# Patient Record
Sex: MALE | Race: ASIAN | Employment: OTHER | URBAN - METROPOLITAN AREA
[De-identification: names, ages, dates, MRNs, and addresses within clinical notes are randomized per-mention and may not be internally consistent; named-entity substitution may affect disease eponyms.]

---

## 2017-01-04 ENCOUNTER — HOSPITAL ENCOUNTER (OUTPATIENT)
Dept: INFUSION THERAPY | Age: 82
Discharge: HOME | End: 2017-01-04
Payer: COMMERCIAL

## 2017-01-04 VITALS
RESPIRATION RATE: 18 BRPM | TEMPERATURE: 97 F | HEART RATE: 54 BPM | DIASTOLIC BLOOD PRESSURE: 78 MMHG | SYSTOLIC BLOOD PRESSURE: 141 MMHG

## 2017-01-04 PROCEDURE — 74011250636 HC RX REV CODE- 250/636: Performed by: INTERNAL MEDICINE

## 2017-01-04 PROCEDURE — 96402 CHEMO HORMON ANTINEOPL SQ/IM: CPT

## 2017-01-04 RX ADMIN — DEGARELIX 80 MG: KIT at 10:33

## 2017-02-01 ENCOUNTER — HOSPITAL ENCOUNTER (OUTPATIENT)
Dept: LAB | Age: 82
Discharge: HOME | End: 2017-02-01
Payer: COMMERCIAL

## 2017-02-01 ENCOUNTER — OFFICE VISIT (OUTPATIENT)
Dept: ONCOLOGY | Age: 82
End: 2017-02-01

## 2017-02-01 ENCOUNTER — HOSPITAL ENCOUNTER (OUTPATIENT)
Dept: INFUSION THERAPY | Age: 82
Discharge: HOME | End: 2017-02-01
Payer: COMMERCIAL

## 2017-02-01 ENCOUNTER — HOSPITAL ENCOUNTER (OUTPATIENT)
Dept: ONCOLOGY | Age: 82
Discharge: HOME | End: 2017-02-01

## 2017-02-01 VITALS
OXYGEN SATURATION: 96 % | HEART RATE: 56 BPM | TEMPERATURE: 97.3 F | SYSTOLIC BLOOD PRESSURE: 146 MMHG | DIASTOLIC BLOOD PRESSURE: 79 MMHG | RESPIRATION RATE: 20 BRPM

## 2017-02-01 VITALS — DIASTOLIC BLOOD PRESSURE: 57 MMHG | SYSTOLIC BLOOD PRESSURE: 152 MMHG | TEMPERATURE: 98.3 F | HEART RATE: 40 BPM

## 2017-02-01 DIAGNOSIS — N18.9 ANEMIA IN CHRONIC KIDNEY DISEASE (CKD): ICD-10-CM

## 2017-02-01 DIAGNOSIS — C61 PROSTATE CANCER (HCC): Primary | ICD-10-CM

## 2017-02-01 DIAGNOSIS — D63.1 ANEMIA IN CHRONIC KIDNEY DISEASE (CKD): ICD-10-CM

## 2017-02-01 DIAGNOSIS — C61 PROSTATE CANCER (HCC): ICD-10-CM

## 2017-02-01 LAB
ALBUMIN SERPL BCP-MCNC: 3.7 G/DL (ref 3.4–5)
ALBUMIN/GLOB SERPL: 1.1 {RATIO} (ref 0.8–1.7)
ALP SERPL-CCNC: 76 U/L (ref 45–117)
ALT SERPL-CCNC: 22 U/L (ref 16–61)
ANION GAP BLD CALC-SCNC: 4 MMOL/L (ref 3–18)
AST SERPL W P-5'-P-CCNC: 16 U/L (ref 15–37)
BASO+EOS+MONOS # BLD AUTO: 1.1 K/UL (ref 0–2.3)
BASO+EOS+MONOS # BLD AUTO: 16 % (ref 0.1–17)
BILIRUB SERPL-MCNC: 0.7 MG/DL (ref 0.2–1)
BUN SERPL-MCNC: 21 MG/DL (ref 7–18)
BUN/CREAT SERPL: 16 (ref 12–20)
CALCIUM SERPL-MCNC: 8.8 MG/DL (ref 8.5–10.1)
CHLORIDE SERPL-SCNC: 105 MMOL/L (ref 100–108)
CO2 SERPL-SCNC: 31 MMOL/L (ref 21–32)
CREAT SERPL-MCNC: 1.32 MG/DL (ref 0.6–1.3)
DIFFERENTIAL METHOD BLD: ABNORMAL
ERYTHROCYTE [DISTWIDTH] IN BLOOD BY AUTOMATED COUNT: 15.5 % (ref 11.5–14.5)
FERRITIN SERPL-MCNC: 267 NG/ML (ref 8–388)
GLOBULIN SER CALC-MCNC: 3.5 G/DL (ref 2–4)
GLUCOSE SERPL-MCNC: 159 MG/DL (ref 74–99)
HCT VFR BLD AUTO: 34.8 % (ref 36–48)
HGB BLD-MCNC: 11.6 G/DL (ref 12–16)
IRON SATN MFR SERPL: 28 %
IRON SERPL-MCNC: 92 UG/DL (ref 50–175)
LYMPHOCYTES # BLD AUTO: 29 % (ref 14–44)
LYMPHOCYTES # BLD: 2 K/UL (ref 1.1–5.9)
MCH RBC QN AUTO: 24.2 PG (ref 25–35)
MCHC RBC AUTO-ENTMCNC: 33.3 G/DL (ref 31–37)
MCV RBC AUTO: 72.7 FL (ref 78–102)
NEUTS SEG # BLD: 3.9 K/UL (ref 1.8–9.5)
NEUTS SEG NFR BLD AUTO: 55 % (ref 40–70)
PLATELET # BLD AUTO: 259 K/UL (ref 140–440)
POTASSIUM SERPL-SCNC: 4.4 MMOL/L (ref 3.5–5.5)
PROT SERPL-MCNC: 7.2 G/DL (ref 6.4–8.2)
PSA SERPL-MCNC: 22.2 NG/ML (ref 0–4)
RBC # BLD AUTO: 4.79 M/UL (ref 4.1–5.1)
SODIUM SERPL-SCNC: 140 MMOL/L (ref 136–145)
TIBC SERPL-MCNC: 326 UG/DL (ref 250–450)
WBC # BLD AUTO: 7 K/UL (ref 4.5–13)

## 2017-02-01 PROCEDURE — 83540 ASSAY OF IRON: CPT | Performed by: NURSE PRACTITIONER

## 2017-02-01 PROCEDURE — 84153 ASSAY OF PSA TOTAL: CPT | Performed by: NURSE PRACTITIONER

## 2017-02-01 PROCEDURE — 80053 COMPREHEN METABOLIC PANEL: CPT | Performed by: NURSE PRACTITIONER

## 2017-02-01 PROCEDURE — 74011250636 HC RX REV CODE- 250/636: Performed by: INTERNAL MEDICINE

## 2017-02-01 PROCEDURE — 82728 ASSAY OF FERRITIN: CPT | Performed by: NURSE PRACTITIONER

## 2017-02-01 PROCEDURE — 96402 CHEMO HORMON ANTINEOPL SQ/IM: CPT

## 2017-02-01 RX ADMIN — DEGARELIX 80 MG: KIT at 14:06

## 2017-02-01 NOTE — PROGRESS NOTES
[facility] Progress Note    Date: 2017    Name: Marycarmen Keita              MRN: [id number]              : 1920    Chemotherapy: Marissa Morgan 80mg    Mr. Brayden Lay was assessed and education was provided. Mr. Kathrin Underwood vitals were reviewed and patient was observed for 5 minutes prior to treatment. Visit Vitals    /79 (BP 1 Location: Right arm, BP Patient Position: At rest)    Pulse (!) 56    Temp 97.3 °F (36.3 °C)    Resp 20    SpO2 96%       Firmagon 80mg injection was administered SC  to L  lower quadrants of abdomen. Mr. Brayden Lay tolerated injection, no s/s of adverse reaction noted, pt has no complaints at this time. Patient armband removed and shredded. Mr. Brayden Lay was discharged from [place] [address] in stable condition at 1410. He is to return on 3/1/2017 at 1300 for his next Marissa Morgan appointment.      Homer Cunha RN  2017  4:23 PM

## 2017-02-01 NOTE — MR AVS SNAPSHOT
Visit Information Date & Time Provider Department Dept. Phone Encounter #  
 2/1/2017  1:45 PM Petty Tavera MD [facility] Office [phone] [id number] Follow-up Instructions Return in about 2 months (around 4/1/2017). Your Appointments 2/1/2017  1:45 PM  
Office Visit with Petty Tavera MD  
[facility] Office ([address]) Appt Note: 2 MO RET [address] [address]  
[address]  
  
   
 [address] [facility] [address] [address]  
  
    
 4/4/2017  1:30 PM  
Office Visit with Tirso Crook NP  
[facility] Office ([address]) Appt Note: 2 month check [address] [address]  
[address]  
  
   
 [address] [facility] [address] [address] Upcoming Health Maintenance Date Due DTaP/Tdap/Td series (1 - Tdap) 11/28/1941 ZOSTER VACCINE AGE 60> 11/28/1980 GLAUCOMA SCREENING Q2Y 11/28/1985 Pneumococcal 65+ High/Highest Risk (1 of 2 - PCV13) 11/28/1985 MEDICARE YEARLY EXAM 11/28/1985 Allergies as of 2/1/2017  Review Complete On: 1/4/2017 By: Elsie Chacko RN Severity Noted Reaction Type Reactions Contrast Agent [Iodine]  10/19/2016   Intolerance Unknown (comments) Lisinopril  10/19/2016   Intolerance Other (comments) Not allergic, current medication Poultry  10/19/2016   Intolerance Other (comments) Current Immunizations  Reviewed on 11/9/2016 Name Date Influenza Vaccine (Quad) PF 10/17/2016 10:23 AM  
  
 Not reviewed this visit You Were Diagnosed With   
  
 Codes Comments Prostate cancer [facility])    -  Primary ICD-10-CM: P67 ICD-9-CM: 055 Anemia in chronic kidney disease (CKD)     ICD-10-CM: N18.9, D63.1 ICD-9-CM: 285.21, 585.9 Vitals BP Pulse Temp Smoking Status 152/57 (!) 40 98.3 °F (36.8 °C) Never Smoker Preferred Pharmacy Pharmacy Name Phone [facility], [address] [phone] Your Updated Medication List  
  
   
This list is accurate as of: 2/1/17  1:36 PM.  Always use your most recent med list.  
  
  
  
  
 aspirin delayed-release 81 mg tablet Take  by mouth daily. cholecalciferol 1,000 unit Cap Commonly known as:  VITAMIN D3 Take  by mouth daily. lisinopril 5 mg tablet Commonly known as:  Arnold Files Take 1 Tab by mouth daily. meloxicam 7.5 mg tablet Commonly known as:  MOBIC Take  by mouth daily. simethicone 80 mg chewable tablet Commonly known as:  Iker Nim Take 80 mg by mouth every six (6) hours as needed for Flatulence. simvastatin 40 mg tablet Commonly known as:  ZOCOR Take 20 mg by mouth nightly. We Performed the Following COMPLETE CBC & AUTO DIFF WBC [99354 CPT(R)] Follow-up Instructions Return in about 2 months (around 4/1/2017). To-Do List   
 02/01/2017 Lab:  CBC WITH 3 PART DIFF   
  
 02/01/2017  2:00 PM  
  Appointment with Alfa Hough 2 at [address] [place] [address] [phone]) Introducing [facility] SERVICES! [employer] introduces [employer] patient portal. Now you can access parts of your medical record, email your doctor's office, and request medication refills online. 1. In your internet browser, go to [email or web address]! 2. Click on the First Time User? Click Here link in the Sign In box. You will see the New Member Sign Up page. 3. Enter your [employer] Access Code exactly as it appears below. You will not need to use this code after youve completed the sign-up process. If you do not sign up before the expiration date, you must request a new code. · [employer] Access Code: [email or web address] Expires: 4/25/2017  1:59 PM 
 
4.  Enter the last four digits of your Social Security Number (xxxx) and Date of Birth (mm/dd/yyyy) as indicated and click Submit. You will be taken to the next sign-up page. 5. Create a Findline ID. This will be your Findline login ID and cannot be changed, so think of one that is secure and easy to remember. 6. Create a Findline password. You can change your password at any time. 7. Enter your Password Reset Question and Answer. This can be used at a later time if you forget your password. 8. Enter your e-mail address. You will receive e-mail notification when new information is available in [address] 9. Click Sign Up. You can now view and download portions of your medical record. 10. Click the Download Summary menu link to download a portable copy of your medical information. If you have questions, please visit the Frequently Asked Questions section of the Findline website. Remember, Findline is NOT to be used for urgent needs. For medical emergencies, dial 911. Now available from your iPhone and Android! Please provide this summary of care documentation to your next provider. Your primary care clinician is listed as Carmella Smith. If you have any questions after today's visit, please call [phone].

## 2017-02-01 NOTE — PROGRESS NOTES
Hematology/Oncology  Progress Note    Name: Kingsley Monaco  Date: 2017  : 1920    Camila Vu MD     Mr. Adriana Sánchez is a 80 y.o.  male who was seen for management of his metastatic prostate cancer and anemia due to chronic kidney disease. Current therapy: Firmagon 240 mg loading dose followed by 80 mg monthly and Procrit whenever the hemoglobin is below 10 g/dL and hematocrit is below 30%. Subjective:     Mr. Adriana Sánchez is a 80-year-old man with metastatic prostate cancer. He continues to receive  Firmagon  Q 4 weeks. He is  Tolerating his treatment well. He is here today for a follow-up visit reporting that he is doing reasonably well. He states he had a cold and cough but now feels better. His energy level continues to  improve. He is scheduled to receive his Valarie Love today in the [place]. He has no new complaints of pain or discomfort. Past medical history, family history, and social history: these were reviewed and remains unchanged. Past Medical History   Diagnosis Date    Dyslipidemia     Hyperlipidemia     Hypertension     Prostate cancer ([facility] [address]     Vitamin D deficiency      No past surgical history on file. Social History     Social History    Marital status: UNKNOWN     Spouse name: N/A    Number of children: N/A    Years of education: N/A     Occupational History    Not on file. Social History Main Topics    Smoking status: Never Smoker    Smokeless tobacco: Not on file    Alcohol use No    Drug use: No    Sexual activity: Not on file     Other Topics Concern    Not on file     Social History Narrative     Family History   Problem Relation Age of Onset   Tricia Calderon Arthritis-osteo Mother     Hypertension Mother     Arthritis-osteo Father     Hypertension Father      Current Outpatient Prescriptions   Medication Sig Dispense Refill    aspirin delayed-release 81 mg tablet Take  by mouth daily.  lisinopril (PRINIVIL, ZESTRIL) 5 mg tablet Take 1 Tab by mouth daily.  30 Tab 0  simvastatin (ZOCOR) 40 mg tablet Take 20 mg by mouth nightly.  simethicone (MYLICON) 80 mg chewable tablet Take 80 mg by mouth every six (6) hours as needed for Flatulence.  meloxicam (MOBIC) 7.5 mg tablet Take  by mouth daily.  cholecalciferol (VITAMIN D3) 1,000 unit cap Take  by mouth daily. Facility-Administered Medications Ordered in Other Visits   Medication Dose Route Frequency Provider Last Rate Last Dose    degarelix (FIRMAGON) injection 80 mg  80 mg SubCUTAneous ONCE Earnstine Bence, MD           Review of Systems  Constitutional: The patient has no acute distress or discomfort. HEENT: The patient denies recent head trauma, eye pain, blurred vision,  hearing deficit, oropharyngeal mucosal pain or lesions, and the patient denies throat pain or discomfort. Lymphatics: The patient denies palpable peripheral lymphadenopathy. Hematologic: The patient denies having bruising, bleeding, or progressive fatigue. Respiratory: Patient denies having shortness of breath, cough, sputum production, fever, or dyspnea on exertion. Cardiovascular: The patient denies having leg pain, leg swelling, heart palpitations, chest permit, chest pain, or lightheadedness. The patient denies having dyspnea on exertion. Gastrointestinal: The patient denies having nausea, emesis, or diarrhea. The patient denies having any hematemesis or blood in the stool. Genitourinary: Patient denies having urinary urgency, frequency, or dysuria. The patient denies having blood in the urine. Psychological: The patient denies having symptoms of nervousness, anxiety, depression, or thoughts of harming self. Skin: Patient denies having skin rashes, skin, ulcerations, or unexplained itching or pruritus. Musculoskeletal: The patient denies having pain in the joints or bones. Objective:     Visit Vitals    /57    Pulse (!) 40    Temp 98.3 °F (36.8 °C)     ECOG PS=0; Pain score=0/10  Physical Exam:   Gen. Appearance: The patient is in no acute distress. Skin: There is no bruise or rash. HEENT: The exam is unremarkable. Neck: Supple without lymphadenopathy or thyromegaly. Lungs: Clear to auscultation and percussion; there are no wheezes or rhonchi. Heart: Regular rate and rhythm; there are no murmurs, gallops, or rubs. Abdomen: Bowel sounds are present and normal.  There is no guarding, tenderness, or hepatosplenomegaly. Extremities: There is no clubbing, cyanosis, or edema. Neurologic: There are no focal neurologic deficits. Lymphatics: There is no palpable peripheral lymphadenopathy. Musculoskeletal: The patient has full range of motion at all joints. There is no evidence of joint deformity or effusions. There is no focal joint tenderness. Psychological/psychiatric: There is no clinical evidence of anxiety, depression, or melancholy. Lab data:      Results for orders placed or performed during the hospital encounter of 02/01/17   CBC WITH 3 PART DIFF     Status: Abnormal   Result Value Ref Range Status    WBC 7.0 4.5 - 13.0 K/uL Final    RBC 4.79 4.10 - 5.10 M/uL Final    HGB 11.6 (L) 12.0 - 16.0 g/dL Final    HCT 34.8 (L) 36 - 48 % Final    MCV 72.7 (L) 78 - 102 FL Final    MCH 24.2 (L) 25.0 - 35.0 PG Final    MCHC 33.3 31 - 37 g/dL Final    RDW 15.5 (H) 11.5 - 14.5 % Final    PLATELET 471 164 - 011 K/uL Final    NEUTROPHILS 55 40 - 70 % Final    MIXED CELLS 16 0.1 - 17 % Final    LYMPHOCYTES 29 14 - 44 % Final    ABS. NEUTROPHILS 3.9 1.8 - 9.5 K/UL Final    ABS. MIXED CELLS 1.1 0.0 - 2.3 K/uL Final    ABS. LYMPHOCYTES 2.0 1.1 - 5.9 K/UL Final     Comment: Test performed at [place] [address] Location. Results Reviewed by Medical Director. DF AUTOMATED   Final           Assessment:     1. Prostate cancer ([facility] [address]    2. Anemia in chronic kidney disease (CKD)        Plan:   Prostate cancer: I have informed the patient that on 12/1/16 his PSA was 18.2.      At this time I will recheck his PSA and we will continue with monthly Firmagon at 80 mg Q 4 weeks. .    Anemia and chronic kidney disease/iron deficiency anemia: I have informed the patient that his hemoglobin today is 11.6 g/dL with hematocrit of 34.8%. At this time I will check his iron profile and ferritin levels. I will have the patient return to the clinic in 2 months for routine follow up. Follow-up in 2 months  Orders Placed This Encounter    COMPLETE CBC & AUTO DIFF WBC    InHouse CBC ([employer])     Standing Status:   Future     Number of Occurrences:   1     Standing Expiration Date:   4/2/1054    METABOLIC PANEL, COMPREHENSIVE     Standing Status:   Future     Standing Expiration Date:   2/2/2018    IRON PROFILE     Standing Status:   Future     Standing Expiration Date:   2/2/2018    FERRITIN     Standing Status:   Future     Standing Expiration Date:   2/2/2018    PROSTATE SPECIFIC AG     Standing Status:   Future     Standing Expiration Date:   2/2/2018       Jeremiah Black NP  2/1/2017     I have assessed the patient independently and  agree with the full assessment as outlined.   Hang Mcmanus MD, Kim Soriano

## 2017-03-01 ENCOUNTER — HOSPITAL ENCOUNTER (OUTPATIENT)
Dept: INFUSION THERAPY | Age: 82
Discharge: HOME | End: 2017-03-01
Payer: MEDICARE

## 2017-03-01 VITALS
RESPIRATION RATE: 18 BRPM | DIASTOLIC BLOOD PRESSURE: 53 MMHG | TEMPERATURE: 97.9 F | HEART RATE: 72 BPM | SYSTOLIC BLOOD PRESSURE: 148 MMHG

## 2017-03-01 PROCEDURE — 96402 CHEMO HORMON ANTINEOPL SQ/IM: CPT

## 2017-03-01 PROCEDURE — 74011250636 HC RX REV CODE- 250/636: Performed by: INTERNAL MEDICINE

## 2017-03-01 RX ADMIN — DEGARELIX 80 MG: KIT at 13:51

## 2017-03-01 NOTE — PROGRESS NOTES
[facility] Progress Note    Date: 2017    Name: Amara Mccabe              MRN: [id number]              : 1920    Chemotherapy: Laurie Dicker 80 mg    Mr. Phi Stevens was assessed and education was provided. Mr. Wai Salas vitals were reviewed and patient was observed for 5 minutes prior to treatment. Visit Vitals    /53 (BP 1 Location: Left arm, BP Patient Position: Sitting)    Pulse 72    Temp 97.9 °F (36.6 °C)    Resp 18       Firmagon 80 mg was administered SC in RLQ of abdomen. Mr. Phi Stevens tolerated infusion, no s/s of adverse reaction noted, pt has no complaints at this time. Patient armband removed and shredded. Mr. Phi Stevens was discharged from [place] [address] in stable condition at [phone]. He is to return on 2017at 1000 for his next appointment.      Sanjuana Olsen RN  2017

## 2017-03-29 ENCOUNTER — HOSPITAL ENCOUNTER (OUTPATIENT)
Dept: INFUSION THERAPY | Age: 82
Discharge: HOME | End: 2017-03-29
Payer: MEDICARE

## 2017-03-29 VITALS
DIASTOLIC BLOOD PRESSURE: 61 MMHG | TEMPERATURE: 97.9 F | SYSTOLIC BLOOD PRESSURE: 131 MMHG | HEART RATE: 60 BPM | RESPIRATION RATE: 16 BRPM

## 2017-03-29 PROCEDURE — 74011250636 HC RX REV CODE- 250/636: Performed by: INTERNAL MEDICINE

## 2017-03-29 PROCEDURE — 96402 CHEMO HORMON ANTINEOPL SQ/IM: CPT

## 2017-03-29 RX ADMIN — DEGARELIX 80 MG: KIT at 10:00

## 2017-03-29 NOTE — PROGRESS NOTES
[facility] Progress Note    Date: 2017    Name: Marycarmen Keita              MRN: [id number]              : 1920    Chemotherapy: Marissa Morgan 80 mg    Mr. Brayden Lay was assessed and education was provided. Mr. Kathrin Underwood vitals were reviewed and patient was observed for 5 minutes prior to treatment. Visit Vitals    /61 (BP 1 Location: Left arm, BP Patient Position: Sitting)    Pulse 60    Temp 97.9 °F (36.6 °C)    Resp 16       Firmagon 80 mg was administered SC in LLQ of abdomen. Mr. Brayden Lay tolerated infusion, no s/s of adverse reaction noted, pt has no complaints at this time. Patient armband removed and shredded. Mr. Brayden Lay was discharged from [place] [address] in stable condition at 1005. He is to return on 2017at 1000 for his next appointment.      Florian Jacobo RN  2017

## 2017-04-01 PROBLEM — I63.9 ACUTE CVA (CEREBROVASCULAR ACCIDENT) (HCC): Status: ACTIVE | Noted: 2017-04-01

## 2017-04-26 ENCOUNTER — HOSPITAL ENCOUNTER (OUTPATIENT)
Dept: INFUSION THERAPY | Age: 82
Discharge: HOME | End: 2017-04-26
Payer: COMMERCIAL

## 2017-04-26 VITALS
TEMPERATURE: 98 F | BODY MASS INDEX: 28.38 KG/M2 | WEIGHT: 160.2 LBS | RESPIRATION RATE: 16 BRPM | DIASTOLIC BLOOD PRESSURE: 67 MMHG | HEART RATE: 45 BPM | SYSTOLIC BLOOD PRESSURE: 140 MMHG

## 2017-04-26 PROCEDURE — 96402 CHEMO HORMON ANTINEOPL SQ/IM: CPT

## 2017-04-26 PROCEDURE — 74011250636 HC RX REV CODE- 250/636: Performed by: INTERNAL MEDICINE

## 2017-04-26 RX ADMIN — DEGARELIX 80 MG: KIT at 10:10

## 2017-05-24 ENCOUNTER — HOSPITAL ENCOUNTER (OUTPATIENT)
Dept: INFUSION THERAPY | Age: 82
Discharge: HOME | End: 2017-05-24
Payer: COMMERCIAL

## 2017-05-24 VITALS
WEIGHT: 162.4 LBS | SYSTOLIC BLOOD PRESSURE: 136 MMHG | DIASTOLIC BLOOD PRESSURE: 48 MMHG | TEMPERATURE: 97.9 F | BODY MASS INDEX: 28.77 KG/M2 | HEART RATE: 41 BPM | RESPIRATION RATE: 16 BRPM

## 2017-05-24 PROCEDURE — 96402 CHEMO HORMON ANTINEOPL SQ/IM: CPT

## 2017-05-24 PROCEDURE — 74011250636 HC RX REV CODE- 250/636: Performed by: INTERNAL MEDICINE

## 2017-05-24 RX ADMIN — DEGARELIX 80 MG: KIT at 13:58

## 2017-05-24 NOTE — PROGRESS NOTES
[facility] Progress Note    Date: May 24, 2017    Name: Shalom Perez              MRN: [id number]              : 1920    Chemotherapy: Rosales Kugel 80 mg    Mr. Vick Duenas was assessed and education was provided. Mr. Pratik Squires vitals were reviewed and patient was observed for 5 minutes prior to treatment. Visit Vitals    /48 (BP 1 Location: Left arm, BP Patient Position: Sitting)    Pulse (!) 41    Temp 97.9 °F (36.6 °C)    Resp 16    Wt 73.7 kg (162 lb 6.4 oz)    BMI 28.77 kg/m2       Firmagon 80 mg was administered SC in LLQ of abdomen. Mr. Vick Duenas tolerated infusion, no s/s of adverse reaction noted, pt has no complaints at this time. Patient armband removed and shredded. Mr. Vick Duenas was discharged from [place] [address] in stable condition at 1400. He has no further appointments scheduled at this time as [place].       Terance Mcardle, RN  May 24, 2017